# Patient Record
Sex: MALE | Race: WHITE | NOT HISPANIC OR LATINO | URBAN - METROPOLITAN AREA
[De-identification: names, ages, dates, MRNs, and addresses within clinical notes are randomized per-mention and may not be internally consistent; named-entity substitution may affect disease eponyms.]

---

## 2020-08-15 ENCOUNTER — OFFICE VISIT (OUTPATIENT)
Dept: URGENT CARE | Facility: CLINIC | Age: 55
End: 2020-08-15
Payer: COMMERCIAL

## 2020-08-15 ENCOUNTER — APPOINTMENT (OUTPATIENT)
Dept: RADIOLOGY | Facility: CLINIC | Age: 55
End: 2020-08-15
Payer: COMMERCIAL

## 2020-08-15 VITALS
TEMPERATURE: 97.9 F | RESPIRATION RATE: 18 BRPM | BODY MASS INDEX: 29.62 KG/M2 | DIASTOLIC BLOOD PRESSURE: 90 MMHG | HEART RATE: 82 BPM | WEIGHT: 200 LBS | OXYGEN SATURATION: 97 % | HEIGHT: 69 IN | SYSTOLIC BLOOD PRESSURE: 136 MMHG

## 2020-08-15 DIAGNOSIS — R04.2 HEMOPTYSIS: ICD-10-CM

## 2020-08-15 DIAGNOSIS — S29.9XXA RIB INJURY: Primary | ICD-10-CM

## 2020-08-15 DIAGNOSIS — R07.81 RIB PAIN ON RIGHT SIDE: ICD-10-CM

## 2020-08-15 PROCEDURE — 99203 OFFICE O/P NEW LOW 30 MIN: CPT | Performed by: EMERGENCY MEDICINE

## 2020-08-15 PROCEDURE — 71101 X-RAY EXAM UNILAT RIBS/CHEST: CPT

## 2020-08-15 RX ORDER — OMEPRAZOLE 20 MG/1
20 CAPSULE, DELAYED RELEASE ORAL DAILY
COMMUNITY

## 2020-08-15 NOTE — PATIENT INSTRUCTIONS
Hemoptysis   WHAT YOU NEED TO KNOW:   Hemoptysis is coughing up blood  This occurs when blood vessels in your airway or lungs weaken or break, and begin to bleed  You may bleed in small or large amounts that appear in your sputum (spit)  DISCHARGE INSTRUCTIONS:   Return to the emergency department if:   · You have new or worsening chest pain or shortness of breath  · Your bleeding gets worse or you cough up a large amount of blood  · You cannot stop vomiting  · You are so dizzy that you think you may fall or faint  · You have pain or swelling in your legs  · Your legs and arms feel cold or look pale  Contact your healthcare provider if:   · You have new or increasing shortness of breath  · You have a fever  · You lose weight without trying  · You feel more weak and tired than usual     · You have a cough that does not improve or gets worse  · You have questions or concerns about your condition or care  Medicines: You may need any of the following:  · Antibiotics  may be given to fight or prevent an infection caused by bacteria  Always take your antibiotics exactly as ordered by your healthcare provider  Do not stop taking your medicine unless directed by your healthcare provider  · Antitussives  help control or stop your cough  · Take your medicine as directed  Contact your healthcare provider if you think your medicine is not helping or if you have side effects  Tell him or her if you are allergic to any medicine  Keep a list of the medicines, vitamins, and herbs you take  Include the amounts, and when and why you take them  Bring the list or the pill bottles to follow-up visits  Carry your medicine list with you in case of an emergency  Follow up with your healthcare provider in 2 days or as directed: You may need frequent visits to monitor your condition and prevent further blood loss  Write down your questions so you remember to ask them during your visits    Use caution with medicines:  Certain medicines, such as NSAIDs, increase your risk for bleeding  Herbal supplements also increase your risk  Examples of herbal supplements are garlic, gingko, and ginseng  Ask your healthcare provider before you take any over-the-counter medicines  Do not smoke, and do not go to smoky areas:  Smoke may worsen your hemoptysis  Nicotine and other chemicals in cigarettes and cigars can also cause lung damage  Ask your healthcare provider for information if you currently smoke and need help to quit  E-cigarettes or smokeless tobacco still contain nicotine  Talk to your healthcare provider before you use these products  © 2017 [facility] Information is for End User's use only and may not be sold, redistributed or otherwise used for commercial purposes  All illustrations and images included in CareNotes® are the copyrighted property of [employer]  or [employer]esh [employer]  The above information is an  only  It is not intended as medical advice for individual conditions or treatments  Talk to your doctor, nurse or pharmacist before following any medical regimen to see if it is safe and effective for you

## 2020-08-15 NOTE — PROGRESS NOTES
[facility]        NAME: Elvis Leon is a 50 y o  male  : 1965    MRN: [id number]  DATE: August 15, 2020  TIME: 9:02 AM    Assessment and Plan   Rib pain on right side [R07 81]  1  Rib pain on right side  XR ribs right w pa chest min 3 views         Patient Instructions     Patient Instructions   Patient complains of right rib pain since fall on same 2 days ago  He admits to coughing up blood today  He denies shortness of breath  Follow up with PCP in 3-5 days  Proceed to  ER if symptoms worsen  Chief Complaint     Chief Complaint   Patient presents with    Rib pain     rib pain from injury happened 2 days ago  Coughed up blood this morning          History of Present Illness       Patient complains of right rib pain since striking same on a shelf 2 days ago  He admits to coughing up blood today  He denies shortness of breath  Review of Systems   Review of Systems   Constitutional: Negative for chills and fever  HENT: Negative for congestion, ear discharge, hearing loss, rhinorrhea and sinus pressure  Respiratory: Negative for cough, chest tightness, shortness of breath and wheezing  Gastrointestinal: Negative for diarrhea and vomiting  Musculoskeletal: Negative for neck stiffness  Skin: Negative for pallor  Neurological: Negative for headaches           Current Medications       Current Outpatient Medications:     omeprazole (PriLOSEC) 20 mg delayed release capsule, Take 20 mg by mouth daily, Disp: , Rfl:     Current Allergies     Allergies as of 08/15/2020    (No Known Allergies)            The following portions of the patient's history were reviewed and updated as appropriate: allergies, current medications, past family history, past medical history, past social history, past surgical history and problem list      Past Medical History:   Diagnosis Date    Known health problems: none        Past Surgical History:   Procedure Laterality Date    NO PAST SURGERIES         Family History   Problem Relation Age of Onset    No Known Problems Mother          Medications have been verified  Objective   /90   Pulse 82   Temp 97 9 °F (36 6 °C) (Tympanic)   Resp 18   Ht 5' 9" (1 753 m)   Wt 90 7 kg (200 lb)   SpO2 97%   BMI 29 53 kg/m²        Physical Exam     Physical Exam  Vitals signs and nursing note reviewed  Constitutional:       General: He is not in acute distress  Appearance: He is well-developed  He is not diaphoretic  HENT:      Head: Normocephalic and atraumatic  Nose: Mucosal edema present  No rhinorrhea  Mouth/Throat:      Pharynx: No posterior oropharyngeal erythema  Eyes:      Conjunctiva/sclera: Conjunctivae normal       Pupils: Pupils are equal, round, and reactive to light  Neck:      Musculoskeletal: Neck supple  Cardiovascular:      Rate and Rhythm: Normal rate and regular rhythm  Heart sounds: Normal heart sounds  Pulmonary:      Effort: Pulmonary effort is normal  No respiratory distress  Breath sounds: Normal breath sounds  No stridor  No wheezing, rhonchi or rales  Chest:      Chest wall: Tenderness present  Lymphadenopathy:      Cervical: No cervical adenopathy  Skin:     General: Skin is warm and dry  Coloration: Skin is not pale  Neurological:      Mental Status: He is alert and oriented to person, place, and time  Psychiatric:         Mood and Affect: Mood normal          Behavior: Behavior normal          Thought Content:  Thought content normal          Judgment: Judgment normal